# Patient Record
Sex: FEMALE | Race: WHITE | Employment: UNEMPLOYED
[De-identification: names, ages, dates, MRNs, and addresses within clinical notes are randomized per-mention and may not be internally consistent; named-entity substitution may affect disease eponyms.]

---

## 2024-08-06 PROBLEM — G90.9 DISORDER OF AUTONOMIC NERVOUS SYSTEM: Status: ACTIVE | Noted: 2024-08-06

## 2024-08-13 ENCOUNTER — HOSPITAL ENCOUNTER (OUTPATIENT)
Facility: HOSPITAL | Age: 19
Setting detail: INFUSION SERIES
Discharge: HOME | End: 2024-08-13
Payer: COMMERCIAL

## 2024-08-13 VITALS
BODY MASS INDEX: 20.24 KG/M2 | RESPIRATION RATE: 18 BRPM | DIASTOLIC BLOOD PRESSURE: 67 MMHG | HEART RATE: 67 BPM | SYSTOLIC BLOOD PRESSURE: 105 MMHG | WEIGHT: 110 LBS | TEMPERATURE: 98.9 F | HEIGHT: 62 IN | OXYGEN SATURATION: 95 %

## 2024-08-13 DIAGNOSIS — G90.9 DISORDER OF AUTONOMIC NERVOUS SYSTEM: Primary | ICD-10-CM

## 2024-08-13 PROCEDURE — 96360 HYDRATION IV INFUSION INIT: CPT

## 2024-08-13 PROCEDURE — 2580000003 HC RX 258: Performed by: PEDIATRICS

## 2024-08-13 RX ORDER — 0.9 % SODIUM CHLORIDE 0.9 %
1000 INTRAVENOUS SOLUTION INTRAVENOUS ONCE
Status: CANCELLED | OUTPATIENT
Start: 2024-08-16 | End: 2024-08-16

## 2024-08-13 RX ORDER — 0.9 % SODIUM CHLORIDE 0.9 %
1000 INTRAVENOUS SOLUTION INTRAVENOUS ONCE
Status: COMPLETED | OUTPATIENT
Start: 2024-08-13 | End: 2024-08-13

## 2024-08-13 RX ADMIN — SODIUM CHLORIDE 1000 ML: 9 INJECTION, SOLUTION INTRAVENOUS at 15:49

## 2024-08-13 ASSESSMENT — PAIN SCALES - GENERAL: PAINLEVEL_OUTOF10: 0

## 2024-08-13 NOTE — PROGRESS NOTES
[facility] Progress Note    Date: 2024    Name: Saige Corona    MRN: [id number]         : 2005    Ms. Corona Arrived ambulatory and in no distress for Hydration.  Assessment was completed, no acute issues at this time, no new complaints voiced. PIV placed in patients left arm without difficulty, no labs ordered for today. Patient states she tolerates the infusion well over one hour.      Ms. Corona's vitals were reviewed.  Vitals:    24 1653   BP: 105/67   Pulse:    Resp:    Temp:    SpO2:      Medications:  Medications Administered         sodium chloride 0.9 % bolus 1,000 mL Admin Date  2024 Action  New Bag Dose  1,000 mL Rate  999 mL/hr Route  IntraVENous Documented By  Ronel Scott RN             Ms. Corona tolerated treatment well and was [facility].   PIV flushed and removed, 2x2 and co-band applied.  Patient is aware of future appointments.    Future Appointments   Date Time Provider Department Center   2024  3:30 PM SS FASTTRACK 3 [facility]   2024  3:30 PM SS FASTTRACK 3 [facility]   2024  3:00 PM SS FASTTRACK 1 [facility]   2024  3:00 PM SS FASTTRACK 1 [facility]   2024  3:30 PM SS FASTTRACK 3 [facility]       Ronel Scott RN  2024

## 2024-08-16 ENCOUNTER — HOSPITAL ENCOUNTER (OUTPATIENT)
Facility: HOSPITAL | Age: 19
Setting detail: INFUSION SERIES
Discharge: HOME | End: 2024-08-16
Payer: COMMERCIAL

## 2024-08-16 VITALS
BODY MASS INDEX: 21.53 KG/M2 | TEMPERATURE: 98 F | DIASTOLIC BLOOD PRESSURE: 56 MMHG | HEART RATE: 75 BPM | SYSTOLIC BLOOD PRESSURE: 99 MMHG | HEIGHT: 62 IN | OXYGEN SATURATION: 97 % | WEIGHT: 117 LBS | RESPIRATION RATE: 18 BRPM

## 2024-08-16 DIAGNOSIS — G90.9 DISORDER OF AUTONOMIC NERVOUS SYSTEM: Primary | ICD-10-CM

## 2024-08-16 PROCEDURE — 2580000003 HC RX 258: Performed by: PEDIATRICS

## 2024-08-16 PROCEDURE — 96360 HYDRATION IV INFUSION INIT: CPT

## 2024-08-16 RX ORDER — DEXTROAMPHETAMINE SACCHARATE, AMPHETAMINE ASPARTATE MONOHYDRATE, DEXTROAMPHETAMINE SULFATE AND AMPHETAMINE SULFATE 2.5; 2.5; 2.5; 2.5 MG/1; MG/1; MG/1; MG/1
10 CAPSULE, EXTENDED RELEASE ORAL EVERY MORNING
COMMUNITY

## 2024-08-16 RX ORDER — MIDODRINE HYDROCHLORIDE 2.5 MG/1
2.5 TABLET ORAL 3 TIMES DAILY
COMMUNITY

## 2024-08-16 RX ORDER — 0.9 % SODIUM CHLORIDE 0.9 %
1000 INTRAVENOUS SOLUTION INTRAVENOUS ONCE
OUTPATIENT
Start: 2024-08-19 | End: 2024-08-19

## 2024-08-16 RX ORDER — 0.9 % SODIUM CHLORIDE 0.9 %
1000 INTRAVENOUS SOLUTION INTRAVENOUS ONCE
Status: COMPLETED | OUTPATIENT
Start: 2024-08-16 | End: 2024-08-16

## 2024-08-16 RX ADMIN — SODIUM CHLORIDE 1000 ML: 9 INJECTION, SOLUTION INTRAVENOUS at 15:41

## 2024-08-16 ASSESSMENT — PAIN SCALES - GENERAL: PAINLEVEL_OUTOF10: 0

## 2024-08-16 NOTE — PROGRESS NOTES
[facility] Progress Note    Date: 2024    Name: Saige Coroan    MRN: [id number]         : 2005    Ms. Corona Arrived ambulatory and in no distress for Hydration  Regimen.  Assessment was completed, no acute issues at this time, no new complaints voiced.  Peripheral IV established in left arm with positive blood return.     Ms. Corona's vitals were reviewed.  Vitals:    24 1535   BP: (!) 103/58   Pulse: 88   Resp: 18   Temp: 98 °F (36.7 °C)   SpO2: 97%       Medications:  Medications Administered         sodium chloride 0.9 % bolus 1,000 mL Admin Date  2024 Action  New Bag Dose  1,000 mL Rate  999 mL/hr Route  IntraVENous Documented By  Carin Garcia, RN             Ms. Corona tolerated treatment well and was [facility] in stable condition. PIV taken out with no issues, pressure applied, wrapped in 2x2 gauze and coban. She is to return on     Future Appointments   Date Time Provider Department Center   2024  3:00 PM SS FASTTRACK 1 [facility]   2024  3:00 PM SS FASTTRACK 1 [facility]   2024  3:30 PM SS FASTTRACK 3 [facility]

## 2024-08-20 ENCOUNTER — HOSPITAL ENCOUNTER (OUTPATIENT)
Facility: HOSPITAL | Age: 19
Setting detail: INFUSION SERIES
End: 2024-08-20

## 2024-08-27 ENCOUNTER — HOSPITAL ENCOUNTER (OUTPATIENT)
Facility: HOSPITAL | Age: 19
Setting detail: INFUSION SERIES
End: 2024-08-27

## 2024-08-30 ENCOUNTER — HOSPITAL ENCOUNTER (OUTPATIENT)
Facility: HOSPITAL | Age: 19
Setting detail: INFUSION SERIES
End: 2024-08-30